# Patient Record
Sex: MALE | Race: BLACK OR AFRICAN AMERICAN | RURAL
[De-identification: names, ages, dates, MRNs, and addresses within clinical notes are randomized per-mention and may not be internally consistent; named-entity substitution may affect disease eponyms.]

---

## 2018-06-28 ENCOUNTER — OFFICE VISIT (OUTPATIENT)
Dept: FAMILY MEDICINE CLINIC | Age: 68
End: 2018-06-28

## 2018-06-28 VITALS
HEART RATE: 77 BPM | HEIGHT: 68 IN | DIASTOLIC BLOOD PRESSURE: 91 MMHG | BODY MASS INDEX: 24.4 KG/M2 | RESPIRATION RATE: 20 BRPM | SYSTOLIC BLOOD PRESSURE: 153 MMHG | OXYGEN SATURATION: 99 % | WEIGHT: 161 LBS | TEMPERATURE: 98.9 F

## 2018-06-28 DIAGNOSIS — L89.301 DECUBITUS ULCER OF BUTTOCK, STAGE 1, UNSPECIFIED LATERALITY: Primary | ICD-10-CM

## 2018-06-28 PROBLEM — C22.8 PRIMARY MALIGNANT NEOPLASM OF LIVER (HCC): Status: ACTIVE | Noted: 2018-06-28

## 2018-06-28 RX ORDER — DESONIDE 0.5 MG/G
CREAM TOPICAL 2 TIMES DAILY
COMMUNITY

## 2018-06-28 RX ORDER — TAMSULOSIN HYDROCHLORIDE 0.4 MG/1
0.4 CAPSULE ORAL DAILY
COMMUNITY

## 2018-06-28 RX ORDER — DICLOFENAC SODIUM 10 MG/G
GEL TOPICAL 4 TIMES DAILY
COMMUNITY

## 2018-06-28 RX ORDER — ACETAMINOPHEN 500 MG
TABLET ORAL
COMMUNITY

## 2018-06-28 RX ORDER — SENNOSIDES 8.6 MG/1
1 TABLET ORAL DAILY
COMMUNITY

## 2018-06-28 RX ORDER — HYDROMORPHONE HYDROCHLORIDE 2 MG/1
TABLET ORAL
COMMUNITY

## 2018-06-28 NOTE — PROGRESS NOTES
Health Maintenance Due   Topic Date Due    DTaP/Tdap/Td series (1 - Tdap) 06/09/1973    ZOSTER VACCINE AGE 60>  04/09/2012    GLAUCOMA SCREENING Q2Y  06/09/2017    Pneumococcal 65+ Low/Medium Risk (1 of 2 - PCV13) 06/09/2017     Body mass index is 24.48 kg/(m^2). 1. Have you been to the ER, urgent care clinic since your last visit? [facility] since your last visit? [facility]    2. Have you seen or consulted any other health care providers outside of the [address] [place] [address] since your last visit? Include any pap smears or colon screening.  No  Reviewed record in preparation for visit and have necessary documentation  Pt did not bring medication to office visit for review  Information was given to pt on Advanced Directives, Living Will  Information was given on Shingles Vaccine  opportunity was given for questions  Goals that were addressed and/or need to be completed during or after this appointment include

## 2018-06-28 NOTE — MR AVS SNAPSHOT
[address] [address] 
[phone] Patient: Sylvia Gayle MRN: [id number] GXL:[birth year] Visit Information Date & Time Provider Department Dept. Phone Encounter #  
 6/28/2018  2:30 PM John Macias MD [address] [id number] Follow-up Instructions Return if symptoms worsen or fail to improve. Upcoming Health Maintenance Date Due DTaP/Tdap/Td series (1 - Tdap) 6/9/1973 ZOSTER VACCINE AGE 60> 4/9/2012 GLAUCOMA SCREENING Q2Y 6/9/2017 Pneumococcal 65+ Low/Medium Risk (1 of 2 - PCV13) 6/9/2017 Allergies as of 6/28/2018  Review Complete On: 6/28/2018 By: John Macias MD  
 No Known Allergies Current Immunizations  Never Reviewed No immunizations on file. Not reviewed this visit You Were Diagnosed With   
  
 Codes Comments Decubitus ulcer of buttock, stage 1, unspecified laterality    -  Primary ICD-10-CM: M47.006 ICD-9-CM: 707.05, 707.21 Vitals BP Pulse Temp Resp Height(growth percentile) Weight(growth percentile) (!) 153/91 77 98.9 °F (37.2 °C) (Oral) 20 5' 8\" (1.727 m) 161 lb (73 kg) SpO2 BMI Smoking Status 99% 24.48 kg/m2 Never Smoker Vitals History BMI and BSA Data Body Mass Index Body Surface Area  
 24.48 kg/m 2 1.87 m 2 Preferred Pharmacy Pharmacy Name Phone [address] [address] [phone] Your Updated Medication List  
  
   
This list is accurate as of 6/28/18  2:50 PM.  Always use your most recent med list. amLODIPine 10 mg tablet Commonly known as:  Lianna Matar Take  by mouth daily. aspirin 81 mg tablet Take 81 mg by mouth.  
  
 carbamide peroxide 6.5 % otic solution Commonly known as:  Umer Oneida Administer 5 Drops into each ear two (2) times a day.   
  
 desonide 0.05 % cream  
 Commonly known as:  Cheral Brace Apply  to affected area two (2) times a day. diclofenac 1 % Gel Commonly known as:  VOLTAREN Apply  to affected area four (4) times daily. HYDROmorphone 2 mg tablet Commonly known as:  DILAUDID Take  by mouth every four (4) hours as needed for Pain. lisinopril 40 mg tablet Commonly known as:  Angelica Brumfield Take 40 mg by mouth daily. senna 8.6 mg tablet Commonly known as:  [employer] Take 1 Tab by mouth daily. sildenafil citrate 50 mg tablet Commonly known as:  VIAGRA Take 1 Tab by mouth as needed. tamsulosin 0.4 mg capsule Commonly known as:  FLOMAX Take 0.4 mg by mouth daily. terazosin 2 mg capsule Commonly known as:  HYTRIN Take 2 mg by mouth nightly. TYLENOL EXTRA STRENGTH 500 mg tablet Generic drug:  acetaminophen Take  by mouth every six (6) hours as needed for Pain. VITAMIN D3 1,000 unit Cap Generic drug:  cholecalciferol Take  by mouth. Follow-up Instructions Return if symptoms worsen or fail to improve. Patient Instructions Learning About Preventing Pressure Sores What are pressure sores? A pressure sore is an injury to the skin caused by constant pressure over a period of time. The constant pressure blocks the blood supply to the skin. This causes skin cells to die and creates a sore. Pressure sores are also called bedsores. Pressure sores usually occur over bony areas, such as the hips, lower back, elbows, heels, and shoulders. Pressure sores can also occur in places where the skin folds over on itself, or where medical equipment presses on the skin, such as when oxygen tubes press on the ears or cheeks. Pressure sores can range from red areas on the surface of the skin to severe tissue damage that goes deep into muscle and bone.  Severe sores are hard to treat and slow to heal. When pressure sores do not heal properly, problems such as bone, blood, and skin infections can develop. What causes pressure sores? Things that cause pressure sores include: · Pressure on the skin and tissues. For example, the sores may happen when a person lies in bed or sits in a wheelchair for a long time. This is the most common cause of pressure sores. · Sliding down in a bed or chair, forcing the skin to fold over itself (shear force). · Being pulled across bed sheets or other surfaces (friction burns). As we get older, our skin gets more thin and dry and less elastic, so it is easier to damage. Poor nutrition and not getting enough fluids make these natural changes in the skin worse. Skin in this condition may easily develop a pressure sore. Skin can also be damaged by sweat, feces, or urine, making pressure sores more likely and harder to heal. 
How can you help prevent pressure sores? If you are not able to do these things yourself, ask a family member or friend for help. Change position often · In a bed, change position every 2 hours. · In a wheelchair or other type of chair, shift your weight every 15 minutes, and give yourself a full relief of weight every hour. ¨ For a weight shift, lean forward and to the left and right. Push up out of the chair with your arms. If you have a chair that tilts, use the tilt control to help relieve pressure. ¨ For a full relief of weight, stand up or move to another chair or bed if you are able to. Personal care · Check your skin every day, especially around bony areas. When a pressure sore is forming, skin temperature can be different than nearby skin. It might be warmer or cooler. The skin can feel either firmer or softer than the surrounding skin. · Keep your skin clean and free of sweat, wound drainage, urine, and feces. · Use skin lotions to keep your skin from drying out and cracking.  Barrier lotions or creams have ingredients that can act as a shield to help protect the skin from moisture or irritation. · Try not to slide or slump across sheets in a chair or bed. And try not to sleep in a recliner chair. Lifestyle choices · Eat healthy foods with plenty of protein to help heal damaged skin and to help new skin grow. · Get plenty of fluids. · Stay at a healthy weight. Being either overweight or underweight can make pressure sores more likely. · If you smoke, stop. Smoking dries the skin and reduces its blood supply. If you need help quitting, talk to your doctor about stop-smoking programs and medicines. These can increase your chances of quitting for good. Ask about using cushions or pads · Overlays are special pads you put on top of a mattress. They provide a softer surface that will fit your body's shape better than a regular mattress. · Cushions or devices can be used to reduce pressure on certain areas of the body. For example, you can use a \"medical heel pillow\" to help prevent pressure sores on heels. You can also get cushions for seating surfaces, such as wheelchair seats. Talk with your doctor about cushions and pads. Some products, such as doughnut-type devices, may actually cause pressure sores or make them worse. Where can you learn more? Go to http://lilliana-malathi.info/. Enter 291 [id number] in the search box to learn more about \"Learning About Preventing Pressure Sores. \" Current as of: March 20, 2017 Content Version: 11.4 © 7075-3723 [employer]. Care instructions adapted under license by [employer] (which disclaims liability or warranty for this information). If you have questions about a medical condition or this instruction, always ask your healthcare professional. [address] [address] any warranty or liability for your use of this information. Introducing hospitals & HEALTH SERVICES!    
 [facility] introduces [employer] patient portal. Now you can access parts of your medical record, email your doctor's office, and request medication refills online. 1. In your internet browser, go to [email or web address] 2. Click on the First Time User? Click Here link in the Sign In box. You will see the New Member Sign Up page. 3. Enter your [employer] Access Code exactly as it appears below. You will not need to use this code after youve completed the sign-up process. If you do not sign up before the expiration date, you must request a new code. · [employer] Access Code: [email or web address] Expires: 9/26/2018  2:12 PM 
 
4. Enter the last four digits of your Social Security Number (xxxx) and Date of Birth (mm/dd/yyyy) as indicated and click Submit. You will be taken to the next sign-up page. 5. Create a [employer] ID. This will be your [employer] login ID and cannot be changed, so think of one that is secure and easy to remember. 6. Create a [employer] password. You can change your password at any time. 7. Enter your Password Reset Question and Answer. This can be used at a later time if you forget your password. 8. Enter your e-mail address. You will receive e-mail notification when new information is available in [address] 9. Click Sign Up. You can now view and download portions of your medical record. 10. Click the Download Summary menu link to download a portable copy of your medical information. If you have questions, please visit the Frequently Asked Questions section of the [employer] website. Remember, [employer] is NOT to be used for urgent needs. For medical emergencies, dial 911. Now available from your iPhone and Android! Please provide this summary of care documentation to your next provider. Your primary care clinician is listed as Eleanor Sibley. If you have any questions after today's visit, please call [phone].

## 2018-06-28 NOTE — PATIENT INSTRUCTIONS
Learning About Preventing Pressure Sores  What are pressure sores? A pressure sore is an injury to the skin caused by constant pressure over a period of time. The constant pressure blocks the blood supply to the skin. This causes skin cells to die and creates a sore. Pressure sores are also called bedsores. Pressure sores usually occur over bony areas, such as the hips, lower back, elbows, heels, and shoulders. Pressure sores can also occur in places where the skin folds over on itself, or where medical equipment presses on the skin, such as when oxygen tubes press on the ears or cheeks. Pressure sores can range from red areas on the surface of the skin to severe tissue damage that goes deep into muscle and bone. Severe sores are hard to treat and slow to heal. When pressure sores do not heal properly, problems such as bone, blood, and skin infections can develop. What causes pressure sores? Things that cause pressure sores include:  · Pressure on the skin and tissues. For example, the sores may happen when a person lies in bed or sits in a wheelchair for a long time. This is the most common cause of pressure sores. · Sliding down in a bed or chair, forcing the skin to fold over itself (shear force). · Being pulled across bed sheets or other surfaces (friction burns). As we get older, our skin gets more thin and dry and less elastic, so it is easier to damage. Poor nutrition and not getting enough fluids make these natural changes in the skin worse. Skin in this condition may easily develop a pressure sore. Skin can also be damaged by sweat, feces, or urine, making pressure sores more likely and harder to heal.  How can you help prevent pressure sores? If you are not able to do these things yourself, ask a family member or friend for help. Change position often  · In a bed, change position every 2 hours.   · In a wheelchair or other type of chair, shift your weight every 15 minutes, and give yourself a full relief of weight every hour. ¨ For a weight shift, lean forward and to the left and right. Push up out of the chair with your arms. If you have a chair that tilts, use the tilt control to help relieve pressure. ¨ For a full relief of weight, stand up or move to another chair or bed if you are able to. Personal care  · Check your skin every day, especially around bony areas. When a pressure sore is forming, skin temperature can be different than nearby skin. It might be warmer or cooler. The skin can feel either firmer or softer than the surrounding skin. · Keep your skin clean and free of sweat, wound drainage, urine, and feces. · Use skin lotions to keep your skin from drying out and cracking. Barrier lotions or creams have ingredients that can act as a shield to help protect the skin from moisture or irritation. · Try not to slide or slump across sheets in a chair or bed. And try not to sleep in a recliner chair. Lifestyle choices  · Eat healthy foods with plenty of protein to help heal damaged skin and to help new skin grow. · Get plenty of fluids. · Stay at a healthy weight. Being either overweight or underweight can make pressure sores more likely. · If you smoke, stop. Smoking dries the skin and reduces its blood supply. If you need help quitting, talk to your doctor about stop-smoking programs and medicines. These can increase your chances of quitting for good. Ask about using cushions or pads  · Overlays are special pads you put on top of a mattress. They provide a softer surface that will fit your body's shape better than a regular mattress. · Cushions or devices can be used to reduce pressure on certain areas of the body. For example, you can use a \"medical heel pillow\" to help prevent pressure sores on heels. You can also get cushions for seating surfaces, such as wheelchair seats. Talk with your doctor about cushions and pads.  Some products, such as doughnut-type devices, may actually cause pressure sores or make them worse. Where can you learn more? Go to http://lilliana-malathi.info/. Enter [id number] [id number] in the search box to learn more about \"Learning About Preventing Pressure Sores. \"  Current as of: March 20, 2017  Content Version: 11.4  © 0551-6956 [employer]. Care instructions adapted under license by [employer] (which disclaims liability or warranty for this information). If you have questions about a medical condition or this instruction, always ask your healthcare professional. [address] any warranty or liability for your use of this information.